# Patient Record
Sex: MALE | Employment: OTHER | URBAN - METROPOLITAN AREA
[De-identification: names, ages, dates, MRNs, and addresses within clinical notes are randomized per-mention and may not be internally consistent; named-entity substitution may affect disease eponyms.]

---

## 2018-09-26 ENCOUNTER — HOSPITAL ENCOUNTER (OUTPATIENT)
Dept: ULTRASOUND IMAGING | Age: 78
Discharge: HOME | End: 2018-09-26
Attending: INTERNAL MEDICINE
Payer: MEDICARE

## 2018-09-26 DIAGNOSIS — N18.30 CHRONIC KIDNEY DISEASE, STAGE III (MODERATE) (HCC): ICD-10-CM

## 2018-09-26 PROCEDURE — 76770 US EXAM ABDO BACK WALL COMP: CPT

## 2018-11-30 ENCOUNTER — HOSPITAL ENCOUNTER (OUTPATIENT)
Dept: MRI IMAGING | Age: 78
Discharge: HOME | End: 2018-11-30
Attending: OPHTHALMOLOGY
Payer: MEDICARE

## 2018-11-30 DIAGNOSIS — H47.093 OTHER DISORDERS OF OPTIC NERVE, NOT ELSEWHERE CLASSIFIED, BILATERAL: ICD-10-CM

## 2018-11-30 LAB — CREAT BLD-MCNC: 1.8 MG/DL (ref 0.6–1.3)

## 2018-11-30 PROCEDURE — 82565 ASSAY OF CREATININE: CPT

## 2018-11-30 PROCEDURE — 70553 MRI BRAIN STEM W/O & W/DYE: CPT

## 2018-11-30 PROCEDURE — 74011250636 HC RX REV CODE- 250/636

## 2018-11-30 PROCEDURE — A9575 INJ GADOTERATE MEGLUMI 0.1ML: HCPCS

## 2018-11-30 RX ORDER — GADOTERATE MEGLUMINE 376.9 MG/ML
19 INJECTION INTRAVENOUS
Status: COMPLETED | OUTPATIENT
Start: 2018-11-30 | End: 2018-11-30

## 2018-11-30 RX ADMIN — GADOTERATE MEGLUMINE 19 ML: 376.9 INJECTION INTRAVENOUS at 14:50

## 2019-09-20 ENCOUNTER — OFFICE VISIT (OUTPATIENT)
Dept: NEUROLOGY | Age: 79
End: 2019-09-20

## 2019-09-20 VITALS
RESPIRATION RATE: 16 BRPM | BODY MASS INDEX: 29.92 KG/M2 | SYSTOLIC BLOOD PRESSURE: 130 MMHG | DIASTOLIC BLOOD PRESSURE: 62 MMHG | WEIGHT: 209 LBS | HEIGHT: 70 IN

## 2019-09-20 DIAGNOSIS — R20.2 PARESTHESIA: ICD-10-CM

## 2019-09-20 DIAGNOSIS — R20.2 PARESTHESIA: Primary | ICD-10-CM

## 2019-09-20 RX ORDER — HYDROCHLOROTHIAZIDE 50 MG/1
25 TABLET ORAL
COMMUNITY
Start: 2019-08-12

## 2019-09-20 RX ORDER — SITAGLIPTIN AND METFORMIN HYDROCHLORIDE 50; 1000 MG/1; MG/1
TABLET, FILM COATED, EXTENDED RELEASE ORAL
COMMUNITY
Start: 2019-08-12

## 2019-09-20 RX ORDER — ASPIRIN 81 MG/1
TABLET ORAL DAILY
COMMUNITY

## 2019-09-20 RX ORDER — NEBIVOLOL HYDROCHLORIDE 2.5 MG/1
TABLET ORAL
COMMUNITY
Start: 2019-08-12

## 2019-09-20 RX ORDER — LOSARTAN POTASSIUM 100 MG/1
TABLET ORAL
COMMUNITY
Start: 2019-08-12

## 2019-09-20 RX ORDER — GLIPIZIDE 10 MG/1
TABLET ORAL
COMMUNITY
Start: 2019-08-12

## 2019-09-20 NOTE — LETTER
9/20/19 Patient: Chad Mcelroy YOB: 1940 Date of Visit: 9/20/2019 Sterling Diggs NP 
[facility] [address] [place] [address] VIA Facsimile: [phone] Dear Sterling Diggs NP, Thank you for referring Mr. Chad Mcelroy to [address] [place] [address] [address] for evaluation. My notes for this consultation are attached. If you have questions, please do not hesitate to call me. I look forward to following your patient along with you. Sincerely, Sanju Johnson MD

## 2019-09-20 NOTE — PROGRESS NOTES
NEUROLOGY NEW PATIENT OFFICE CONSULTATION      9/20/2019    RE: Eric Bermudez         1940      REFERRED BY:  Valeria Michele NP        CHIEF COMPLAINT:  This is Eric Bermudez is a 70 y.o. male right handed retiree  who had concerns including New Patient (Patient is here today for numbness in face, hands and feet. Also, c/o no smell or taste. ). HPI:     1 yr, patient noted symmetric bilateral hands and feet numbness, described as burning    (+) perioral faciall numbness  (-) weakness in feet  (+) weakness of   (-) neck pain  (-) lower back pain    HgbA1c 7.1 as per patient  Heavy metals checked 1 yr ago was said to be okay. 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    Social Hx  Social History     Socioeconomic History    Marital status: UNKNOWN     Spouse name: Not on file    Number of children: Not on file    Years of education: Not on file    Highest education level: Not on file   Tobacco Use    Smoking status: Former Smoker    Smokeless tobacco: Never Used       Family Hx  Family History   Problem Relation Age of Onset    Dementia Brother     Parkinson's Disease Brother     Heart Disease Brother        ALLERGIES  No Known Allergies    CURRENT MEDS  Current Outpatient Medications   Medication Sig Dispense Refill    glipiZIDE (GLUCOTROL) 10 mg tablet       hydroCHLOROthiazide (HYDRODIURIL) 50 mg tablet       losartan (COZAAR) 100 mg tablet       BYSTOLIC 2.5 mg tablet       JANUMET XR 50-1,000 mg TM24       aspirin delayed-release 81 mg tablet Take  by mouth daily. PREVIOUS WORKUP: (reviewed)  IMAGING:    CT Results (recent):  No results found for this or any previous visit.     MRI Results (recent):  No results found for this or any previous visit. IR Results (recent):  No results found for this or any previous visit. VAS/US Results (recent):  No results found for this or any previous visit. LABS (reviewed)  No results found for this or any previous visit. Physical Exam:     Visit Vitals  /62 (BP 1 Location: Right arm, BP Patient Position: Sitting)   Resp 16   Ht 5' 10\" (1.778 m)   Wt 94.8 kg (209 lb)   BMI 29.99 kg/m²     General:  Alert, cooperative, no distress. Head:  Normocephalic, without obvious abnormality, atraumatic. Eyes:  Conjunctivae/corneas clear. Lungs:  Heart:   Non labored breathing  Regular rate and rhythm, no carotid bruits   Abdomen:   Soft, non-distended   Extremities: Extremities normal, atraumatic, no cyanosis or edema. Pulses: 2+ and symmetric all extremities. Skin: Skin color, texture, turgor normal. No rashes or lesions.   Neurologic Exam     Gen: Attention normal             Language: naming, repetition, fluency normal             Memory: intact recent and remote memory  Cranial Nerves:  I: smell Not tested   II: visual fields Full to confrontation   II: pupils Equal, round, reactive to light   II: optic disc No papilledema   III,VII: ptosis none   III,IV,VI: extraocular muscles  Full ROM   V: mastication normal   V: facial light touch sensation  normal   VII: facial muscle function   symmetric   VIII: hearing symmetric   IX: soft palate elevation  normal   XI: trapezius strength  5/5   XI: sternocleidomastoid strength 5/5   XI: neck flexion strength  5/5   XII: tongue  midline     Motor: normal bulk and tone, no tremor              Strength: 5/5 all four extremities  Sensory: increase PP bottom of feet and R thumb and index finger; Reduced vibration and JPS in both feet  Reflexes: 1+ UE and knees, absent ankles throughout; Down going toes  Coordination: Good FTN and HTS  Gait: normal gait including tandem, able to walk on toes and heels           Impression: Lucille Eng is a 70 y.o. male who  has a past medical history of Asthma, Diabetes ([address] for 30 yrs, Heart disease, Hypertension, and Kidney disease who for 1 yr, noted symmetric bilateral hands and feet numbness, described as burning associated with perioral facial numbness. Consideration includes sensory polyneuropathy more likely due to long standin g history of diabetes. Patient should be evaluated for other metabolic, nutritional and inflammatory causes of his symptoms. RECOMMENDATIONS  1. I had a long discussion with patient. Discussed diagnosis, prognosis, pathophysiology and available treatment. All questions were answered. 2. Blood test for LEO, ESR, RF, TSH, Vit B12, Folate, Vit B6, SPEP/PABLO  3. EMG/NCS of the R UE and R LE with polyneuropathy protocol  4. Depending on above, will consider trial of Gabapentin  5. Optimize medical management of diabetes to prevent worsening of symptoms      Follow-up and Dispositions    · Return for above testing.             Thank you for the consultation      Anil Rodriges MD  Diplomate, American Board of Psychiatry and Neurology  Diplomate, Neuromuscular Medicine  Diplomate, American Board of Electrodiagnostic Medicine        CC: Emiliano De La Fuente NP  Fax: [phone]

## 2019-09-20 NOTE — PROGRESS NOTES
Chief Complaint   Patient presents with    New Patient     Patient is here today for numbness in face, hands and feet. Also, c/o no smell or taste.      Visit Vitals  /62 (BP 1 Location: Right arm, BP Patient Position: Sitting)   Resp 16   Ht 5' 10\" (1.778 m)   Wt 94.8 kg (209 lb)   BMI 29.99 kg/m²

## 2019-09-23 ENCOUNTER — OFFICE VISIT (OUTPATIENT)
Dept: NEUROLOGY | Age: 79
End: 2019-09-23

## 2019-09-23 DIAGNOSIS — R20.2 PARESTHESIA: Primary | ICD-10-CM

## 2019-09-23 NOTE — LETTER
2019 3:32 PM 
 
Patient:  Mary Zheng YOB: 1940 Date of Visit: 2019 Dear Annie Arshad NP 
[address] [facility] [address] [facility], [facility] [address] VIA Facsimile: [phone] 
 : 
 
 
Thank you for referring Mr. Mary Zheng to me for EMG/NCS. EMG/ NCS Report [address] [address], [address] [facility] [address] Ph: [phone] FAX: [phone] Test Date:  2019 Patient: Ge Waite : 1940 Physician: Lizzette Groves MD  
Sex: Male Height: ' \" Ref Phys: Yan Jarquin NP  
ID#: [id number] Weight:  lbs. Technician: Funmilayo Laws Patient History / Exam: 
Patient is coming for polyneuropathy evaluation. Mary Zheng is a 70 y.o. male who  has a past medical history of Asthma, Diabetes ([address] for 30 yrs, Heart disease, Hypertension, and Kidney disease who for 1 yr, noted symmetric bilateral hands and feet numbness, described as burning associated with perioral facial numbness. (+) C4C6 surgery with residual R thumb and index finger numbness. Exam: Patient awake, alert, follows commands, clear speech; hearing grossly intact; EOMI, (-) facial asymmetry, tongue midline; Motor: normal bulk and tone, no tremor; Strength: 5/5 all four extremities Sensory: increase PP bottom of feet and R thumb and index finger; Reduced vibration and JPS in both feet Reflexes: 1+ UE and knees, absent ankles throughout; Down going toes Coordination: Good FTN and HTS Gait: normal gait including tandem, able to walk on toes and heels EMG & NCV Findings: 
Evaluation of the right Fibular motor nerve showed normal distal onset latency (4.9 ms), normal amplitude (3.3 mV), normal conduction velocity (B Fib-Ankle, 38 m/s), and normal conduction velocity (Poplt-B Fib, 45 m/s).   The right median motor nerve showed normal distal onset latency (4.2 ms), normal amplitude (9.9 mV), and decreased conduction velocity (Elbow-Wrist, 46 m/s). The right tibial motor nerve showed normal distal onset latency (6.1 ms), normal amplitude (3.1 mV), and decreased conduction velocity (Knee-Ankle, 36 m/s). The right ulnar motor nerve showed prolonged distal onset latency (3.2 ms), normal amplitude (11.9 mV), decreased conduction velocity (B Elbow-Wrist, 43 m/s), and decreased conduction velocity (A Elbow-B Elbow, 43 m/s). The right median sensory and the right ulnar sensory nerves showed no response (Wrist). The right radial sensory nerve showed normal distal peak latency (2.3 ms) and reduced amplitude (5.6 µV). The right Sup Fibular sensory nerve showed no response (Lower leg). The right sural sensory nerve showed no response (Calf). F Wave studies indicate that the right ulnar F wave has prolonged latency (34.48 ms). All remaining F Wave latencies were within normal limits. H-reflex studies indicate that the right tibial H-reflex has no response. Needle evaluation of the right pronator teres muscle showed diminished recruitment, Incr Duration, and increased motor unit amplitude. All remaining muscles (as indicated in the following table) showed no evidence of electrical instability. Impression: ABNORMAL Extensive electrodiagnostic examination of the right upper and lower extremities shows the followin) Absent sensory responses in the lower extremity. Absent ulnar and median sensory responses. Reduced right radial sensory amplitude response. Some slowing of the motor amplitude responses. 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 2) Chronic, without active, motor axon loss changes in the right pronator muscle more likely related to prior history of right C6 surgery. Vernon Ruiz MD 
Diplomate, American Board of Psychiatry and Neurology Diplomate, Neuromuscular Medicine Diplomate, [address] [place] [address] Board of Electrodiagnostic Medicine Director, [address] [place] [address] Accredited Laboratory with Exemplary Status Nerve Conduction Studies Anti Sensory Summary Table Stim Site NR Peak (ms) Norm Peak (ms) P-T Amp (µV) Norm P-T Amp Site1 Site2 Dist (cm) Right Median Anti Sensory (2nd Digit)  31.3°C Wrist NR  <4  >13 Wrist 2nd Digit 14.0 Right Radial Anti Sensory (Base 1st Digit)  29.8°C Wrist    2.3 <2.8 5.6 >11 Wrist Base 1st Digit 10.0 Right Sup Fibular Anti Sensory (Lat ankle)  30°C Lower leg NR  <4.6  >4 Lower leg Lat ankle 10.0 Right Sural Anti Sensory (Lat Mall)  31.2°C Calf NR  <4.5  >4.0 Calf Lat Mall 14.0 Right Ulnar Anti Sensory (5th Digit)  30.2°C Wrist NR  <4.0  >9 Wrist 5th Digit 14.0 Motor Summary Table Stim Site NR Onset (ms) Norm Onset (ms) O-P Amp (mV) Norm O-P Amp Amp (Prev) (%) Site1 Site2 Dist (cm) Wong (m/s) Norm Wong (m/s) Right Fibular Motor (Ext Dig Brev)  29.4°C Ankle    4.9 <6.5 3.3 >1.1 100.0 Ankle Ext Dig Brev 8.0 B Fib    13.0  2.9  87.9 B Fib Ankle 31.0 38 >38 Poplt    15.2  2.8  96.6 Poplt B Fib 10.0 45 >42 Right Median Motor (Abd Poll Brev)  31°C Wrist    4.2 <4.5 9.9 >4.1 100.0 Wrist Abd Poll Brev 8.0 Elbow    8.9  9.2  92.9 Elbow Wrist 21.5 46 >49 Right Tibial Motor (Abd Lizama Brev)  30.5°C Ankle    6.1 <6.1 3.1 >1.1 100.0 Ankle Abd Lizama Brev 8.0 Knee    16.2  1.9  61.3 Knee Ankle 36.0 36 >39 Right Ulnar Motor (Abd Dig Minimi)  29.8°C Wrist    3.2 <3.1 11.9 >7.0 100.0 Wrist Abd Dig Minimi 8.0  >50 B Elbow    8.4  10.7  89.9 B Elbow Wrist 22.5 43 >50 A Elbow    10.7  10.2  95.3 A Elbow B Elbow 10.0 43 >50 F Wave Studies NR F-Lat (ms) Lat Norm (ms) L-R F-Lat (ms) L-R Lat Norm Right Tibial (Mrkrs) (Abd Hallucis)  29.8°C  
   40.00 <56  <5.7 Right Ulnar (Mrkrs) (Abd Dig Min)  29.5°C  
   34.48 <32  <2.5 H Reflex Studies NR H-Lat (ms) L-R H-Lat (ms) L-R Lat Norm Right Tibial (Gastroc)  29.2°C  
 NR   <2.0 EMG Side Muscle Nerve Root Ins Act Fibs Psw Recrt Duration Amp Poly Comment Right Ext Dig Brev Dp Br Peron L5, S1 Nml Nml Nml Nml Nml Nml Nml Right AbdHallucis MedPlantar S1-2 Nml Nml Nml Nml Nml Nml Nml Right AntTibialis Dp Br Peron L4-5 Nml Nml Nml Nml Nml Nml Nml Right MedGastroc Tibial S1-2 Nml Nml Nml Nml Nml Nml Nml Right 1stDorInt Ulnar C8-T1 Nml Nml Nml Nml Nml Nml Nml Right VastusLat Femoral L2-4 Nml Nml Nml Nml Nml Nml Nml Right ExtIndicis Radial (Post Int) C7-8 Nml Nml Nml Nml Nml Nml Nml Right Abd Poll Brev Median C8-T1 Nml Nml Nml Nml Nml Nml Nml Right PronatorTeres Median C6-7 Nml Nml Nml Reduced Incr Incr Nml Right Biceps Musculocut C5-6 Nml Nml Nml Nml Nml Nml Nml Right Triceps Radial C6-7-8 Nml Nml Nml Nml Nml Nml Nml Right Deltoid Axillary C5-6 Nml Nml Nml Nml Nml Nml Nml Right Lower Cerv Parasp Rami C7,T1 Nml Nml Nml Nml Nml Nml Nml Right GluteusMed SupGluteal L4-S1 Nml Nml Nml Nml Nml Nml Nml Right Lower Lumb Parasp Rami L5,S1 Nml Nml Nml Nml Nml Nml Nml Nerve Conduction Studies Anti Sensory Left/Right Comparison Stim Site L Lat (ms) R Lat (ms) L-R Lat (ms) L Amp (µV) R Amp (µV) L-R Amp (%) Site1 Site2 L Wong (m/s) R Wong (m/s) L-R Wong (m/s) Median Anti Sensory (2nd Digit)  31.3°C Wrist       Wrist 2nd Digit Radial Anti Sensory (Base 1st Digit)  29.8°C Wrist  1.5   5.6  Wrist Base 1st Digit  67 Sup Fibular Anti Sensory (Lat ankle)  30°C Lower leg       Lower leg Lat ankle Sural Anti Sensory (Lat Mall)  31.2°C Calf       Calf Lat Mall Ulnar Anti Sensory (5th Digit)  30.2°C Wrist       Wrist 5th Digit Motor Left/Right Comparison Stim Site L Lat (ms) R Lat (ms) L-R Lat (ms) L Amp (mV) R Amp (mV) L-R Amp (%) Site1 Site2 L Wong (m/s) R Wong (m/s) L-R Wong (m/s) Fibular Motor (Ext Dig Brev)  29.4°C Ankle  4.9   3.3  Ankle Ext Dig Brev     
B Fib  13.0   2.9  B Fib Ankle  38 Poplt  15.2   2.8  Poplt B Fib  45 Median Motor (Abd Poll Brev)  31°C Wrist  4.2   9.9  Wrist Abd Poll Brev     
Elbow  8.9   9.2  Elbow Wrist  46 Tibial Motor (Abd Lizama Brev)  30.5°C Ankle  6.1   3.1  Ankle Abd Lizama Brev     
Knee  16.2   1.9  Knee Ankle  36 Ulnar Motor (Abd Dig Minimi)  29.8°C Wrist  3.2   11.9  Wrist Abd Dig Minimi B Elbow  8.4   10.7  B Elbow Wrist  43 A Elbow  10.7   10.2  A Elbow B Elbow  43 Waveforms:

## 2019-09-23 NOTE — PROCEDURES
EMG/ NCS Report  DR[facility] RE[facility]ABILITATION  - [facility]  [facility]  [facility], [address] [place], [address]   Ph: [phone]   FAX: [phone]  Test Date:  2019    Patient: Chad Lanier : 1940 Physician: Nadia Stoddard MD   Sex: Male Height: ' \" Ref Phys: Marilynn Garcia, NP   ID#: [id number] Weight:  lbs. Technician: Nixon Anguiano     Patient History / Exam:  Patient is coming for polyneuropathy evaluation. Shari Fajardo is a 70 y.o. male who  has a past medical history of Asthma, Diabetes ([address] for 30 yrs, Heart disease, Hypertension, and Kidney disease who for 1 yr, noted symmetric bilateral hands and feet numbness, described as burning associated with perioral facial numbness. (+) C4C6 surgery with residual R thumb and index finger numbness. Exam: Patient awake, alert, follows commands, clear speech; hearing grossly intact; EOMI, (-) facial asymmetry, tongue midline; Motor: normal bulk and tone, no tremor; Strength: 5/5 all four extremities  Sensory: increase PP bottom of feet and R thumb and index finger; Reduced vibration and JPS in both feet  Reflexes: 1+ UE and knees, absent ankles throughout; Down going toes  Coordination: Good FTN and HTS  Gait: normal gait including tandem, able to walk on toes and heels          EMG & NCV Findings:  Evaluation of the right Fibular motor nerve showed normal distal onset latency (4.9 ms), normal amplitude (3.3 mV), normal conduction velocity (B Fib-Ankle, 38 m/s), and normal conduction velocity (Poplt-B Fib, 45 m/s). The right median motor nerve showed normal distal onset latency (4.2 ms), normal amplitude (9.9 mV), and decreased conduction velocity (Elbow-Wrist, 46 m/s). The right tibial motor nerve showed normal distal onset latency (6.1 ms), normal amplitude (3.1 mV), and decreased conduction velocity (Knee-Ankle, 36 m/s).   The right ulnar motor nerve showed prolonged distal onset latency (3.2 ms), normal amplitude (11.9 mV), decreased conduction velocity (B Elbow-Wrist, 43 m/s), and decreased conduction velocity (A Elbow-B Elbow, 43 m/s). The right median sensory and the right ulnar sensory nerves showed no response (Wrist). The right radial sensory nerve showed normal distal peak latency (2.3 ms) and reduced amplitude (5.6 µV). The right Sup Fibular sensory nerve showed no response (Lower leg). The right sural sensory nerve showed no response (Calf). F Wave studies indicate that the right ulnar F wave has prolonged latency (34.48 ms). All remaining F Wave latencies were within normal limits. H-reflex studies indicate that the right tibial H-reflex has no response. Needle evaluation of the right pronator teres muscle showed diminished recruitment, Incr Duration, and increased motor unit amplitude. All remaining muscles (as indicated in the following table) showed no evidence of electrical instability. Impression:  ABNORMAL    Extensive electrodiagnostic examination of the right upper and lower extremities shows the followin) Absent sensory responses in the lower extremity. Absent ulnar and median sensory responses. Reduced right radial sensory amplitude response. Some slowing of the motor amplitude responses. 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 2) Chronic, without active, motor axon loss changes in the right pronator muscle more likely related to prior history of right C6 surgery.         Richmond Freed MD  Diplomate, American Board of Psychiatry and Neurology  Diplomate, Neuromuscular Medicine  Diplomate, American Board of Electrodiagnostic Medicine  Director, [address] [place] [address] Accredited Laboratory with Exemplary Status        Nerve Conduction Studies  Anti Sensory Summary Table     Stim Site NR Peak (ms) Norm Peak (ms) P-T Amp (µV) Norm P-T Amp Site1 Site2 Dist (cm)   Right Median Anti Sensory (2nd Digit)  31.3°C   Wrist NR  <4  >13 Wrist 2nd Digit 14.0   Right Radial Anti Sensory (Base 1st Digit)  29.8°C   Wrist    2.3 <2.8 5.6 >11 Wrist Base 1st Digit 10.0   Right Sup Fibular Anti Sensory (Lat ankle)  30°C   Lower leg NR  <4.6  >4 Lower leg Lat ankle 10.0   Right Sural Anti Sensory (Lat Mall)  31.2°C   Calf NR  <4.5  >4.0 Calf Lat Mall 14.0   Right Ulnar Anti Sensory (5th Digit)  30.2°C   Wrist NR  <4.0  >9 Wrist 5th Digit 14.0     Motor Summary Table     Stim Site NR Onset (ms) Norm Onset (ms) O-P Amp (mV) Norm O-P Amp Amp (Prev) (%) Site1 Site2 Dist (cm) Wong (m/s) Norm Wong (m/s)   Right Fibular Motor (Ext Dig Brev)  29.4°C   Ankle    4.9 <6.5 3.3 >1.1 100.0 Ankle Ext Dig Brev 8.0     B Fib    13.0  2.9  87.9 B Fib Ankle 31.0 38 >38   Poplt    15.2  2.8  96.6 Poplt B Fib 10.0 45 >42   Right Median Motor (Abd Poll Brev)  31°C   Wrist    4.2 <4.5 9.9 >4.1 100.0 Wrist Abd Poll Brev 8.0     Elbow    8.9  9.2  92.9 Elbow Wrist 21.5 46 >49   Right Tibial Motor (Abd Lizama Brev)  30.5°C   Ankle    6.1 <6.1 3.1 >1.1 100.0 Ankle Abd Lizama Brev 8.0     Knee    16.2  1.9  61.3 Knee Ankle 36.0 36 >39   Right Ulnar Motor (Abd Dig Minimi)  29.8°C   Wrist    3.2 <3.1 11.9 >7.0 100.0 Wrist Abd Dig Minimi 8.0  >50   B Elbow    8.4  10.7  89.9 B Elbow Wrist 22.5 43 >50   A Elbow    10.7  10.2  95.3 A Elbow B Elbow 10.0 43 >50     F Wave Studies     NR F-Lat (ms) Lat Norm (ms) L-R F-Lat (ms) L-R Lat Norm   Right Tibial (Mrkrs) (Abd Hallucis)  29.8°C      40.00 <56  <5.7   Right Ulnar (Mrkrs) (Abd Dig Min)  29.5°C      34.48 <32  <2.5     H Reflex Studies     NR H-Lat (ms) L-R H-Lat (ms) L-R Lat Norm   Right Tibial (Gastroc)  29.2°C   NR   <2.0     EMG     Side Muscle Nerve Root Ins Act Fibs Psw Recrt Duration Amp Poly Comment   Right Ext Dig Brev Dp Br Peron L5, S1 Nml Nml Nml Nml Nml Nml Nml    Right AbdHallucis MedPlantar S1-2 Nml Nml Nml Nml Nml Nml Nml    Right AntTibialis Dp Br Peron L4-5 Nml Nml Nml Nml Nml Nml Nml    Right MedGastroc Tibial S1-2 Nml Nml Nml Nml Nml Nml Nml    Right 1stDorInt Ulnar C8-T1 Nml Nml Nml Nml Nml Nml Nml    Right VastusLat Femoral L2-4 Nml Nml Nml Nml Nml Nml Nml    Right ExtIndicis Radial (Post Int) C7-8 Nml Nml Nml Nml Nml Nml Nml    Right Abd Poll Brev Median C8-T1 Nml Nml Nml Nml Nml Nml Nml    Right PronatorTeres Median C6-7 Nml Nml Nml Reduced Incr Incr Nml    Right Biceps Musculocut C5-6 Nml Nml Nml Nml Nml Nml Nml    Right Triceps Radial C6-7-8 Nml Nml Nml Nml Nml Nml Nml    Right Deltoid Axillary C5-6 Nml Nml Nml Nml Nml Nml Nml    Right Lower Cerv Parasp Rami C7,T1 Nml Nml Nml Nml Nml Nml Nml    Right GluteusMed SupGluteal L4-S1 Nml Nml Nml Nml Nml Nml Nml    Right Lower Lumb Parasp Rami L5,S1 Nml Nml Nml Nml Nml Nml Nml                Nerve Conduction Studies  Anti Sensory Left/Right Comparison     Stim Site L Lat (ms) R Lat (ms) L-R Lat (ms) L Amp (µV) R Amp (µV) L-R Amp (%) Site1 Site2 L Wong (m/s) R Wong (m/s) L-R Wong (m/s)   Median Anti Sensory (2nd Digit)  31.3°C   Wrist       Wrist 2nd Digit      Radial Anti Sensory (Base 1st Digit)  29.8°C   Wrist  1.5   5.6  Wrist Base 1st Digit  67    Sup Fibular Anti Sensory (Lat ankle)  30°C   Lower leg       Lower leg Lat ankle      Sural Anti Sensory (Lat Mall)  31.2°C   Calf       Calf Lat Mall      Ulnar Anti Sensory (5th Digit)  30.2°C   Wrist       Wrist 5th Digit        Motor Left/Right Comparison     Stim Site L Lat (ms) R Lat (ms) L-R Lat (ms) L Amp (mV) R Amp (mV) L-R Amp (%) Site1 Site2 L Wong (m/s) R Wong (m/s) L-R Wong (m/s)   Fibular Motor (Ext Dig Brev)  29.4°C   Ankle  4.9   3.3  Ankle Ext Dig Brev      B Fib  13.0   2.9  B Fib Ankle  38    Poplt  15.2   2.8  Poplt B Fib  45    Median Motor (Abd Poll Brev)  31°C   Wrist  4.2   9.9  Wrist Abd Poll Brev      Elbow  8.9   9.2  Elbow Wrist  46    Tibial Motor (Abd Lizama Brev)  30.5°C   Ankle  6.1   3.1  Ankle Abd Lizama Brev      Knee  16.2   1.9  Knee Ankle  36    Ulnar Motor (Abd Dig Minimi)  29.8°C   Wrist  3.2   11.9  Wrist Abd Dig Minimi      B Elbow  8.4   10.7  B Elbow Wrist  43    A Elbow  10.7   10.2  A Elbow B Elbow  43          Waveforms:

## 2019-09-24 LAB
ALBUMIN SERPL ELPH-MCNC: 3.8 G/DL (ref 2.9–4.4)
ALBUMIN/GLOB SERPL: 1.1 {RATIO} (ref 0.7–1.7)
ALPHA1 GLOB SERPL ELPH-MCNC: 0.2 G/DL (ref 0–0.4)
ALPHA2 GLOB SERPL ELPH-MCNC: 0.9 G/DL (ref 0.4–1)
ANA SER QL: NEGATIVE
B-GLOBULIN SERPL ELPH-MCNC: 1.2 G/DL (ref 0.7–1.3)
ERYTHROCYTE [SEDIMENTATION RATE] IN BLOOD BY WESTERGREN METHOD: 8 MM/HR (ref 0–30)
FOLATE SERPL-MCNC: 7.4 NG/ML
GAMMA GLOB SERPL ELPH-MCNC: 1.2 G/DL (ref 0.4–1.8)
GLOBULIN SER-MCNC: 3.5 G/DL (ref 2.2–3.9)
IGA SERPL-MCNC: 292 MG/DL (ref 61–437)
IGG SERPL-MCNC: 1143 MG/DL (ref 700–1600)
IGM SERPL-MCNC: 32 MG/DL (ref 15–143)
INTERPRETATION SERPL IEP-IMP: NORMAL
M PROTEIN SERPL ELPH-MCNC: NORMAL G/DL
PLEASE NOTE:, 149534: NORMAL
PROT SERPL-MCNC: 7.3 G/DL (ref 6–8.5)
RHEUMATOID FACT SERPL-ACNC: <10 IU/ML (ref 0–13.9)
TSH SERPL DL<=0.005 MIU/L-ACNC: 2.14 UIU/ML (ref 0.45–4.5)
VIT B12 SERPL-MCNC: 305 PG/ML (ref 232–1245)
VIT B6 SERPL-MCNC: 5.1 UG/L (ref 5.3–46.7)

## 2019-10-24 ENCOUNTER — OFFICE VISIT (OUTPATIENT)
Dept: NEUROLOGY | Age: 79
End: 2019-10-24

## 2019-10-24 VITALS
RESPIRATION RATE: 20 BRPM | SYSTOLIC BLOOD PRESSURE: 124 MMHG | BODY MASS INDEX: 29.78 KG/M2 | OXYGEN SATURATION: 98 % | HEIGHT: 70 IN | WEIGHT: 208 LBS | HEART RATE: 74 BPM | DIASTOLIC BLOOD PRESSURE: 66 MMHG

## 2019-10-24 DIAGNOSIS — E11.42 DIABETIC SENSORY POLYNEUROPATHY (HCC): ICD-10-CM

## 2019-10-24 DIAGNOSIS — R20.2 PARESTHESIA: Primary | ICD-10-CM

## 2019-10-24 NOTE — LETTER
10/24/19 Patient: Ina Mcclelland YOB: 1940 Date of Visit: 10/24/2019 Braulio Zelaya NP 
[address] [facility] [address] [place] [address] VIA Facsimile: [phone] Dear Braulio Zelaya NP, Thank you for [facility] [address] [facility] evaluation. My notes for this consultation are attached. If you have questions, please do not hesitate to call me. I look forward to following your patient along with you. Sincerely, Sabina Kehr, MD

## 2019-10-24 NOTE — PROGRESS NOTES
Go over lab results     A little more energy since starting the Vitamin B complex     Numbness and pain in the feet since his last visit is basically the same   Still has a little bit in his hands (both)

## 2019-10-24 NOTE — PROGRESS NOTES
Neurology Progress Note    Patient ID:  Juanis Hendricks  [id number]  60 y.o.  1940      Subjective:   History:  Juanis Hendricks is a 70 y.o. male who  has a past medical history of Asthma, Diabetes ([address] for 30 yrs, Heart disease, Hypertension, and Kidney disease who for 1 yr, noted symmetric bilateral hands and feet numbness, described as burning associated with perioral facial numbness. Consideration includes sensory polyneuropathy more likely due to long standing history of diabetes. Patient was also found to have low Vit B6 and Vit B12 so he started taking Vit B complex with note of increased energy with no worsening of neuropathy. Patient goes biking and feels sugar is better controlled.     ROS:  Per HPI-  Otherwise the remainder of ROS was negative    Social Hx  Social History     Socioeconomic History    Marital status: UNKNOWN     Spouse name: Not on file    Number of children: Not on file    Years of education: Not on file    Highest education level: Not on file   Tobacco Use    Smoking status: Former Smoker    Smokeless tobacco: Never Used       Meds:  Current Outpatient Medications on File Prior to Visit   Medication Sig Dispense Refill    glipiZIDE (GLUCOTROL) 10 mg tablet       hydroCHLOROthiazide (HYDRODIURIL) 50 mg tablet       losartan (COZAAR) 100 mg tablet       BYSTOLIC 2.5 mg tablet       JANUMET XR 50-1,000 mg TM24       aspirin delayed-release 81 mg tablet Take  by mouth daily. No current facility-administered medications on file prior to visit. Imaging:    CT Results (recent):  No results found for this or any previous visit.     MRI Results (recent):  Results from [place] encounter on 11/30/18   MRI BRAIN W WO CONT    Narrative EXAM: MRI BRAIN W WO CONT    TECHNIQUE: Sagittal and axial T1-weighted images axial FLAIR, T2-weighted,  diffusion weighted, gradient echo,  Coronal STIR, axial and coronal T1-weighted  and axial and coronal postcontrast fat saturation T1 weighted thin section  images of the orbits. Axial postcontrast T1-weighted images of the brain . Pre  and postcontrast images of the brain and orbits were obtained. IV CONTRAST:  19 cc Dotarem    INDICATION:  Other disorders of optic nerve, not elsewhere classified,  bilateral, optic atrophy, right greater than left   COMPARISON:  None. FINDINGS:  BRAIN PARENCHYMA:  No acute infarct. Small chronic hematoma in the right  external capsule. Moderate cerebral white matter signal abnormality most  consistent with intracranial small vessel disease. No parenchymal masses or  abnormal enhancement. INTRACRANIAL HEMORRHAGE: None. CSF SPACES:  Normal in size and morphology for the patient's age. BASAL CISTERNS:  Patent. MIDLINE SHIFT: None. VASCULAR SYSTEM:  Normal flow voids. PARANASAL SINUSES AND MASTOID AIR CELLS:  Clear. VISUALIZED ORBITS:  Thin section images obtained. Bilateral optic atrophy, right  more so than left, with prominence of the optic nerve sheaths. No signal  abnormality or enhancement of the optic nerves. Previous bilateral cataract  surgery. No orbital masses or extraocular muscle enlargement. Normal appearance  of the cavernous sinus. VISUALIZED UPPER CERVICAL SPINE:  No significant abnormalities. SELLA:  No enlargement or  focal abnormality. SKULL BASE:  No significant abnormalities. CALVARIUM:  Normal.        Impression IMPRESSION:    1. Bilateral optic atrophy, right greater than left. No lesions in the orbits or  cavernous sinus. 2. Moderate cerebral white matter signal abnormality most consistent with  chronic small vessel ischemic change. Small chronic hematoma in the right  external capsule. IR Results (recent):  No results found for this or any previous visit. VAS/US Results (recent):  No results found for this or any previous visit.     Reviewed records in [facility] and media tab today    Lab Review     Office Visit on 09/20/2019   Component Date Value Ref Range Status    Vitamin B12 09/20/2019 305  232 - 1,245 pg/mL Final    Folate 09/20/2019 7.4  >3.0 ng/mL Final    Comment: A serum folate concentration of less than 3.1 ng/mL is  considered to represent clinical deficiency.  Vitamin B6 09/20/2019 5.1* 5.3 - 46.7 ug/L Final    TSH 09/20/2019 2.140  0.450 - 4.500 uIU/mL Final    Antinuclear Antibodies Direct 09/20/2019 Negative  Negative Final    Sed rate (ESR) 09/20/2019 8  0 - 30 mm/hr Final    Rheumatoid factor 09/20/2019 <10.0  0.0 - 13.9 IU/mL Final    Immunoglobulin G, Qt. 09/20/2019 1,143  700 - 1,600 mg/dL Final    Immunoglobulin A, Qt. 09/20/2019 292  61 - 437 mg/dL Final    Immunoglobulin M, Qt. 09/20/2019 32  15 - 143 mg/dL Final    Protein, total 09/20/2019 7.3  6.0 - 8.5 g/dL Final    Albumin 09/20/2019 3.8  2.9 - 4.4 g/dL Final    Alpha-1-Globulin 09/20/2019 0.2  0.0 - 0.4 g/dL Final    Alpha-2-Globulin 09/20/2019 0.9  0.4 - 1.0 g/dL Final    Beta Globulin 09/20/2019 1.2  0.7 - 1.3 g/dL Final    Gamma Globulin 09/20/2019 1.2  0.4 - 1.8 g/dL Final    M-Jorge L 09/20/2019 Not Observed  Not Observed g/dL Final    Globulin, total 09/20/2019 3.5  2.2 - 3.9 g/dL Final    A/G ratio 09/20/2019 1.1  0.7 - 1.7 Final    Immunofixation Result 09/20/2019 Comment   Final    An apparent normal immunofixation pattern.  Please note 09/20/2019 Comment   Final    Comment: Protein electrophoresis scan will follow via computer, mail, or   delivery. Objective:       Exam:  Visit Vitals  /66   Pulse 74   Resp 20   Ht 5' 10\" (1.778 m)   Wt 94.3 kg (208 lb)   SpO2 98%   BMI 29.84 kg/m²     Gen: Awake, alert, follows commands  Appropriate appearance, normal speech. Oriented to all spheres. No visual field defect on confrontation exam.  Full eyes movement, with no nystagmus, no diplopia, no ptosis. Normal gag and swallow.   All remaining cranial nerves were normal  Motor: normal bulk and tone, no tremor Strength: 5/5 all four extremities  Sensory: increase PP bottom of feet and R thumb and index finger; Reduced vibration and JPS in both feet  Reflexes: 1+ UE and knees, absent ankles throughout; Down going toes  Coordination: Good FTN and HTS  Gait: normal gait including tandem, able to walk on toes and heels    Assessment:       ICD-10-CM ICD-9-CM    1. Paresthesia R20.2 782.0 VITAMIN B12 & FOLATE      VITAMIN B6   2. 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 Continue Vit B complex  2. Will check Vit B6, B12 and Folate levels 1 week before next follow up  3. Optimize medical management of diabetes to prevent worsening of symptoms c/o PCP    Follow-up and Dispositions    · Return in about 7 months (around 5/24/2020).                Ankur Castro MD  Diplomate, American Board of Psychiatry and Neurology  Diplomate, Neuromuscular Medicine  Diplomate, American Board of Electrodiagnostic Medicine

## 2020-04-24 DIAGNOSIS — R20.2 PARESTHESIA: ICD-10-CM

## 2020-05-08 LAB
FOLATE SERPL-MCNC: >20 NG/ML
VIT B12 SERPL-MCNC: 278 PG/ML (ref 232–1245)
VIT B6 SERPL-MCNC: 24.4 UG/L (ref 5.3–46.7)

## 2020-05-14 ENCOUNTER — VIRTUAL VISIT (OUTPATIENT)
Dept: NEUROLOGY | Age: 80
End: 2020-05-14

## 2020-05-14 DIAGNOSIS — G45.9 TIA (TRANSIENT ISCHEMIC ATTACK): Primary | ICD-10-CM

## 2020-05-14 DIAGNOSIS — E11.42 DIABETIC SENSORY POLYNEUROPATHY (HCC): ICD-10-CM

## 2020-05-14 DIAGNOSIS — I63.89 OTHER CEREBRAL INFARCTION (HCC): ICD-10-CM

## 2020-05-14 RX ORDER — ATORVASTATIN CALCIUM 40 MG/1
40 TABLET, FILM COATED ORAL DAILY
Qty: 30 TAB | Refills: 5 | Status: SHIPPED | OUTPATIENT
Start: 2020-05-14 | End: 2020-07-15 | Stop reason: SDUPTHER

## 2020-05-14 NOTE — LETTER
5/14/2020 2:34 PM 
 
Patient:  Twila Padilla YOB: 1940 Date of Visit: 5/14/2020 Dear Teo Mattson, JULIEN 
[address] [address] VIA Facsimile: [phone]: Thank you for referring Mr. Twila Padilla to me for evaluation/treatment. Below are the relevant portions of my assessment and plan of care. If you have questions, please do not hesitate to call me. I look forward to following Mr. Olga Lidia Jose along with you. Sincerely, Ken Hinson MD

## 2020-05-14 NOTE — PROGRESS NOTES
Mike Lane is a 68 y.o. male who was seen by synchronous (real-time) audio-video technology on 5/14/2020. Subjective:   Shayne Koosamara melody Fabian a past medical history of Asthma, Diabetes ([address] for 30 yrs, Heart disease, Hypertension, and Kidney disease who since 2018 noted symmetric bilateral hands and feet numbness, described as burning associated with perioral facial numbness. Consideration includes sensory polyneuropathy more likely due to long standing history of diabetes. 1 month ago, patient developed sudden L sided weakness lasting for 1 min. Patient did not go to the ER. Takes ASA 81 every day. Still takes Vit B complex with no worsening of neuropathy. Has not seen a heart doctor for 2 yrs.       Recent tests done:    Tot cholesterol 186    ROS:  Per HPI-  Otherwise 12 point ROS was negative    Social Hx:  Social History     Socioeconomic History    Marital status: UNKNOWN     Spouse name: Not on file    Number of children: Not on file    Years of education: Not on file    Highest education level: Not on file   Tobacco Use    Smoking status: Former Smoker    Smokeless tobacco: Never Used       Meds:  Current Outpatient Medications on File Prior to Visit   Medication Sig Dispense Refill    glipiZIDE (GLUCOTROL) 10 mg tablet       hydroCHLOROthiazide (HYDRODIURIL) 50 mg tablet       losartan (COZAAR) 100 mg tablet       BYSTOLIC 2.5 mg tablet       JANUMET XR 50-1,000 mg TM24       aspirin delayed-release 81 mg tablet Take  by mouth daily. No current facility-administered medications on file prior to visit. Imaging:    CT Results (recent):  No results found for this or any previous visit.     MRI Results (recent):  Results from [place] encounter on 11/30/18   MRI BRAIN W WO CONT    Narrative EXAM: MRI BRAIN W WO CONT    TECHNIQUE: Sagittal and axial T1-weighted images axial FLAIR, T2-weighted,  diffusion weighted, gradient echo,  Coronal STIR, axial and coronal T1-weighted  and axial and coronal postcontrast fat saturation T1 weighted thin section  images of the orbits. Axial postcontrast T1-weighted images of the brain . Pre  and postcontrast images of the brain and orbits were obtained. IV CONTRAST:  19 cc Dotarem    INDICATION:  Other disorders of optic nerve, not elsewhere classified,  bilateral, optic atrophy, right greater than left   COMPARISON:  None. FINDINGS:  BRAIN PARENCHYMA:  No acute infarct. Small chronic hematoma in the right  external capsule. Moderate cerebral white matter signal abnormality most  consistent with intracranial small vessel disease. No parenchymal masses or  abnormal enhancement. INTRACRANIAL HEMORRHAGE: None. CSF SPACES:  Normal in size and morphology for the patient's age. BASAL CISTERNS:  Patent. MIDLINE SHIFT: None. VASCULAR SYSTEM:  Normal flow voids. PARANASAL SINUSES AND MASTOID AIR CELLS:  Clear. VISUALIZED ORBITS:  Thin section images obtained. Bilateral optic atrophy, right  more so than left, with prominence of the optic nerve sheaths. No signal  abnormality or enhancement of the optic nerves. Previous bilateral cataract  surgery. No orbital masses or extraocular muscle enlargement. Normal appearance  of the cavernous sinus. VISUALIZED UPPER CERVICAL SPINE:  No significant abnormalities. SELLA:  No enlargement or  focal abnormality. SKULL BASE:  No significant abnormalities. CALVARIUM:  Normal.        Impression IMPRESSION:    1. Bilateral optic atrophy, right greater than left. No lesions in the orbits or  cavernous sinus. 2. Moderate cerebral white matter signal abnormality most consistent with  chronic small vessel ischemic change. Small chronic hematoma in the right  external capsule. IR Results (recent):  No results found for this or any previous visit. VAS/US Results (recent):  No results found for this or any previous visit.     Reviewed records in [facility] and media tab today    Lab Review     Orders Only on 04/24/2020   Component Date Value Ref Range Status    Vitamin B12 05/05/2020 278  232 - 1,245 pg/mL Final    Folate 05/05/2020 >20.0  >3.0 ng/mL Final    Comment: A serum folate concentration of less than 3.1 ng/mL is  considered to represent clinical deficiency.  Vitamin B6 05/05/2020 24.4  5.3 - 46.7 ug/L Final         Objective:     General: alert, cooperative, no distress   Mental  status: mental status: alert, oriented to person, place, and time, normal mood, behavior, speech, dress, motor activity, and thought processes   Resp: resp: normal effort and no respiratory distress   Neuro: neuro: no gross deficits   Skin: skin: no discoloration or lesions of concern on visible areas     Due to this being a TeleHealth evaluation, many elements of the physical examination are unable to be assessed. Assessment:       ICD-10-CM ICD-9-CM    1. TIA (transient ischemic attack) G45.9 435.9 atorvastatin (LIPITOR) 40 mg tablet      DUPLEX CAROTID BILATERAL AMB NEURO   2. 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 Other cerebral infarction ([facility]  I63.89 434.91 DUPLEX CAROTID BILATERAL AMB NEURO     Vitamin B12 deficiency      Plan:   1. Discussed that he may have suffered a TIA and recommend to go to ER if this happens again  2. Will do carotid doppler to look for stenosis  3. Increase  every day for stroke prevention  4. . Start on Lipitor 40 mg every day with goal of LDL < 70  5. Advise Vit B12 IM injection daily for 7 days, then Q monthly c/o PCP  6. Optimize medical management of diabetes and HTN  7. Advise to follow up with cardiologist to evaluate for arrhythmia causing his TIA       Follow-up and Dispositions    · Return in about 6 weeks (around 6/25/2020).            CPT Codes 86037-55109 for Established Patients may apply to this Telehealth Visit      We discussed the expected course, resolution and complications of the diagnosis(es) in detail. Medication risks, benefits, costs, interactions, and alternatives were discussed as indicated. I advised him to contact the office if his condition worsens, changes or fails to improve as anticipated. He expressed understanding with the diagnosis(es) and plan. Pursuant to the emergency declaration under the [address] [place] [address] [facility] authority and the [employer] Act, this Virtual  Visit was conducted, with patient's consent, to reduce the patient's risk of exposure to COVID-19 and provide continuity of care for an established patient. Services were provided through a video synchronous discussion virtually to substitute for in-person clinic visit.        Louie Barlow MD  Diplomate, American Board of Psychiatry and Neurology  Diplomate, Neuromuscular Medicine  Diplomate, American Board of Electrodiagnostic Medicine

## 2020-07-15 DIAGNOSIS — G45.9 TIA (TRANSIENT ISCHEMIC ATTACK): ICD-10-CM

## 2020-07-15 DIAGNOSIS — E11.42 DIABETIC SENSORY POLYNEUROPATHY (HCC): ICD-10-CM

## 2020-07-15 RX ORDER — ATORVASTATIN CALCIUM 40 MG/1
40 TABLET, FILM COATED ORAL DAILY
Qty: 90 TAB | Refills: 1 | Status: SHIPPED | OUTPATIENT
Start: 2020-07-15

## 2020-07-15 NOTE — TELEPHONE ENCOUNTER
Received a fax from [facility] patient requesting a 90 day refill of atorvastatin 40mg. Patient last seen 5/2019.

## 2021-07-15 ENCOUNTER — PATIENT MESSAGE (OUTPATIENT)
Dept: NEUROLOGY | Age: 81
End: 2021-07-15

## 2021-08-06 ENCOUNTER — OFFICE VISIT (OUTPATIENT)
Dept: NEUROLOGY | Age: 81
End: 2021-08-06
Payer: MEDICARE

## 2021-08-06 VITALS
WEIGHT: 198.8 LBS | TEMPERATURE: 97.9 F | HEART RATE: 70 BPM | OXYGEN SATURATION: 100 % | BODY MASS INDEX: 28.52 KG/M2 | SYSTOLIC BLOOD PRESSURE: 132 MMHG | DIASTOLIC BLOOD PRESSURE: 62 MMHG

## 2021-08-06 DIAGNOSIS — G45.9 TIA (TRANSIENT ISCHEMIC ATTACK): ICD-10-CM

## 2021-08-06 DIAGNOSIS — E11.42 DIABETIC SENSORY POLYNEUROPATHY (HCC): Primary | ICD-10-CM

## 2021-08-06 PROCEDURE — 99215 OFFICE O/P EST HI 40 MIN: CPT | Performed by: PSYCHIATRY & NEUROLOGY

## 2021-08-06 PROCEDURE — G8510 SCR DEP NEG, NO PLAN REQD: HCPCS | Performed by: PSYCHIATRY & NEUROLOGY

## 2021-08-06 PROCEDURE — G8536 NO DOC ELDER MAL SCRN: HCPCS | Performed by: PSYCHIATRY & NEUROLOGY

## 2021-08-06 PROCEDURE — G8427 DOCREV CUR MEDS BY ELIG CLIN: HCPCS | Performed by: PSYCHIATRY & NEUROLOGY

## 2021-08-06 PROCEDURE — 1101F PT FALLS ASSESS-DOCD LE1/YR: CPT | Performed by: PSYCHIATRY & NEUROLOGY

## 2021-08-06 PROCEDURE — G8419 CALC BMI OUT NRM PARAM NOF/U: HCPCS | Performed by: PSYCHIATRY & NEUROLOGY

## 2021-08-06 RX ORDER — ROSUVASTATIN CALCIUM 5 MG/1
5 TABLET, COATED ORAL
COMMUNITY

## 2021-08-06 RX ORDER — METFORMIN HYDROCHLORIDE 1000 MG/1
1000 TABLET ORAL 2 TIMES DAILY WITH MEALS
COMMUNITY

## 2021-08-06 NOTE — LETTER
8/6/2021    Patient: Yumiko Jaeger   YOB: 1940   Date of Visit: 8/6/2021     [facility] [address] [place] [id number]  Via Fax: [phone]    Dear Ayala Oliva NP,      Thank you for referring Mr. Yumiko Jaeger to [facility] for evaluation. My notes for this consultation are attached. If you have questions, please do not hesitate to call me. I look forward to following your patient along with you.       Sincerely,    Loretta Dorman MD

## 2021-08-06 NOTE — PROGRESS NOTES
Neurology Progress Note    Patient ID:  Olita Babinski  [id number]  58 y.o.  1940      Subjective:   History:  Danny Medina a Marcin Embs a past medical history of Asthma, Diabetes ([address] for 30 yrs, Heart disease, Hypertension, and Kidney disease who since 2018 noted symmetric bilateral hands and feet numbness, described as burning associated with perioral facial numbness. Consideration includes sensory polyneuropathy more likely due to long standing history of diabetes. April 2021, patient developed sudden L sided weakness lasting for 1 min. Patient did not go to the ER. Takes ASA 81 every day. Patient tried  but made him bruise easily so went back to 81 mg every day. Now on Lipitor 40 mg every day   No new TIA event  Noted darkening of toe nails  Stopped taking Vit B12 injections    Noted darkening of his toes. Neuropathy remains the same. ROS:  Per HPI-  Otherwise the remainder of ROS was negative    Social Hx  Social History     Socioeconomic History    Marital status: UNKNOWN     Spouse name: Not on file    Number of children: Not on file    Years of education: Not on file    Highest education level: Not on file   Tobacco Use    Smoking status: Former Smoker    Smokeless tobacco: Never Used     Social Determinants of Health     Financial Resource Strain:     Difficulty of Paying Living Expenses:    Food Insecurity:     Worried About Running Out of Food in the Last Year:     [address] in the Last Year:    Transportation Needs:     Lack of Transportation (Medical):      Lack of Transportation (Non-Medical):    Physical Activity:     Days of Exercise per Week:     Minutes of Exercise per Session:    Stress:     Feeling of Stress :    Social Connections:     Frequency of Communication with Friends and Family:     Frequency of Social Gatherings with Friends and Family:     Attends Church Services:     Active Member of Clubs or Organizations:     Attends Club or Organization Meetings:     Marital Status:        Meds:  Current Outpatient Medications on File Prior to Visit   Medication Sig Dispense Refill    metFORMIN (GLUCOPHAGE) 1,000 mg tablet Take 1,000 mg by mouth two (2) times daily (with meals).  rosuvastatin (CRESTOR) 5 mg tablet Take 5 mg by mouth nightly.  glipiZIDE (GLUCOTROL) 10 mg tablet       hydroCHLOROthiazide (HYDRODIURIL) 50 mg tablet 25 mg.      losartan (COZAAR) 100 mg tablet       BYSTOLIC 2.5 mg tablet       aspirin delayed-release 81 mg tablet Take  by mouth daily.  atorvastatin (LIPITOR) 40 mg tablet Take 1 Tab by mouth daily. (Patient not taking: Reported on 8/6/2021) 90 Tab 1    JANUMET XR 50-1,000 mg TM24  (Patient not taking: Reported on 8/6/2021)       No current facility-administered medications on file prior to visit. Imaging:    CT Results (recent):  No results found for this or any previous visit. MRI Results (recent):  Results from [place] encounter on 11/30/18    MRI BRAIN W WO CONT    Narrative  EXAM: MRI BRAIN W WO CONT    TECHNIQUE: Sagittal and axial T1-weighted images axial FLAIR, T2-weighted,  diffusion weighted, gradient echo,  Coronal STIR, axial and coronal T1-weighted  and axial and coronal postcontrast fat saturation T1 weighted thin section  images of the orbits. Axial postcontrast T1-weighted images of the brain . Pre  and postcontrast images of the brain and orbits were obtained. IV CONTRAST:  19 cc Dotarem    INDICATION:  Other disorders of optic nerve, not elsewhere classified,  bilateral, optic atrophy, right greater than left  COMPARISON:  None. FINDINGS:  BRAIN PARENCHYMA:  No acute infarct. Small chronic hematoma in the right  external capsule. Moderate cerebral white matter signal abnormality most  consistent with intracranial small vessel disease. No parenchymal masses or  abnormal enhancement. INTRACRANIAL HEMORRHAGE: None.   CSF SPACES:  Normal in size and morphology for the patient's age. BASAL CISTERNS:  Patent. MIDLINE SHIFT: None. VASCULAR SYSTEM:  Normal flow voids. PARANASAL SINUSES AND MASTOID AIR CELLS:  Clear. VISUALIZED ORBITS:  Thin section images obtained. Bilateral optic atrophy, right  more so than left, with prominence of the optic nerve sheaths. No signal  abnormality or enhancement of the optic nerves. Previous bilateral cataract  surgery. No orbital masses or extraocular muscle enlargement. Normal appearance  of the cavernous sinus. VISUALIZED UPPER CERVICAL SPINE:  No significant abnormalities. SELLA:  No enlargement or  focal abnormality. SKULL BASE:  No significant abnormalities. CALVARIUM:  Normal.    Impression  IMPRESSION:  1. Bilateral optic atrophy, right greater than left. No lesions in the orbits or  cavernous sinus. 2. Moderate cerebral white matter signal abnormality most consistent with  chronic small vessel ischemic change. Small chronic hematoma in the right  external capsule. IR Results (recent):  No results found for this or any previous visit. VAS/US Results (recent):  No results found for this or any previous visit. Reviewed records in [facility] and [facility] tab today    Lab Review     No visits with results within 3 Month(s) from this visit. Latest known visit with results is:   Abstract on 06/10/2020   Component Date Value Ref Range Status    SARS-CoV-2 Ab, IgG 05/06/2020 Negative   Final         Objective:       Exam:  Visit Vitals  /62 (BP 1 Location: Left arm, BP Patient Position: Sitting, BP Cuff Size: Large adult)   Pulse 70   Temp 97.9 °F (36.6 °C) (Temporal)   Wt 90.2 kg (198 lb 12.8 oz)   SpO2 100%   BMI 28.52 kg/m²     (+) darkening of toes with possible fungal infection in both feet    Assessment:       ICD-10-CM ICD-9-CM    1. Diabetic sensory polyneuropathy (HCC)  E11.42 250.60 REFERRAL TO PODIATRY     357.2    2.  TIA (transient ischemic attack)  G45.9 435.9        Vitamin B12 deficiency      Plan:   1. Advise to take Vit B12 1000 mcg every day. Advise to recheck level c/o PCP if not done recently with goal of Vit B12 level in the high normal range (900 to 1000)  2. Trial of alpha lipoic acid 600 mg every day   3. Refer to podiatry to address toe issues  4. Continue ASA 81 mg every day for stroke prevention  5. . Continue Lipitor 40 mg every day with goal of LDL < 70  6. Optimize medical management of diabetes and HTN      Follow-up and Dispositions    · Return if symptoms worsen or fail to improve.                Keren Mishra MD  Diplomate, American Board of Psychiatry and Neurology  Diplomate, Neuromuscular Medicine  Diplomate, American Board of Electrodiagnostic Medicine  used